# Patient Record
Sex: MALE | Race: BLACK OR AFRICAN AMERICAN | NOT HISPANIC OR LATINO | Employment: UNEMPLOYED
[De-identification: names, ages, dates, MRNs, and addresses within clinical notes are randomized per-mention and may not be internally consistent; named-entity substitution may affect disease eponyms.]

---

## 2023-10-22 ENCOUNTER — HEALTH MAINTENANCE LETTER (OUTPATIENT)
Age: 23
End: 2023-10-22

## 2023-11-22 ENCOUNTER — OFFICE VISIT (OUTPATIENT)
Dept: FAMILY MEDICINE | Facility: CLINIC | Age: 23
End: 2023-11-22
Payer: COMMERCIAL

## 2023-11-22 VITALS
WEIGHT: 260 LBS | DIASTOLIC BLOOD PRESSURE: 84 MMHG | TEMPERATURE: 97.9 F | BODY MASS INDEX: 33.37 KG/M2 | HEIGHT: 74 IN | RESPIRATION RATE: 16 BRPM | HEART RATE: 75 BPM | SYSTOLIC BLOOD PRESSURE: 130 MMHG | OXYGEN SATURATION: 100 %

## 2023-11-22 DIAGNOSIS — Z23 NEED FOR VACCINATION: ICD-10-CM

## 2023-11-22 DIAGNOSIS — D23.39 DERMOID CYST OF FOREHEAD: Primary | ICD-10-CM

## 2023-11-22 DIAGNOSIS — L30.9 DERMATITIS: ICD-10-CM

## 2023-11-22 PROCEDURE — 90471 IMMUNIZATION ADMIN: CPT | Performed by: FAMILY MEDICINE

## 2023-11-22 PROCEDURE — 90715 TDAP VACCINE 7 YRS/> IM: CPT | Performed by: FAMILY MEDICINE

## 2023-11-22 PROCEDURE — 99203 OFFICE O/P NEW LOW 30 MIN: CPT | Mod: 25 | Performed by: FAMILY MEDICINE

## 2023-11-22 RX ORDER — TRIAMCINOLONE ACETONIDE 1 MG/G
OINTMENT TOPICAL 2 TIMES DAILY
Qty: 30 G | Refills: 1 | Status: SHIPPED | OUTPATIENT
Start: 2023-11-22

## 2023-11-22 ASSESSMENT — PAIN SCALES - GENERAL: PAINLEVEL: NO PAIN (0)

## 2023-11-22 NOTE — PROGRESS NOTES
"  Assessment & Plan     Dermoid cyst of forehead  No obvious pore.  Due to location, will send to plastic surgery   Visible on face to naked eye  - Adult Plastic Surgery  Referral; Future    Need for vaccination  - TDAP 10-64Y (ADACEL,BOOSTRIX)    Dermatitis  - triamcinolone (KENALOG) 0.1 % external ointment; Apply topically 2 times daily    Schedule routine physical      BMI:   Estimated body mass index is 33.38 kg/m  as calculated from the following:    Height as of this encounter: 1.88 m (6' 2\").    Weight as of this encounter: 117.9 kg (260 lb).     Raz Romero DO  [facility]    Viki Mattson is a 23 year old, presenting for the following health issues:  Mass (On head)      11/22/2023     8:10 AM   Additional Questions   Roomed by Lola AMBRIZ       History of Present Illness       Reason for visit:  Bump on forehead  Symptom onset:  More than a month    He eats 0-1 servings of fruits and vegetables daily.He consumes 2 sweetened beverage(s) daily.He exercises with enough effort to increase his heart rate 9 or less minutes per day.  He exercises with enough effort to increase his heart rate 3 or less days per week.   He is taking medications regularly.     Bump on his head   Has been growing   On forehead   Doesn't hurt    Has been there for months          Objective    BP (!) 154/86 (BP Location: Left arm, Patient Position: Sitting, Cuff Size: Adult Large)   Pulse 75   Temp 97.9  F (36.6  C) (Temporal)   Resp 16   Ht 1.88 m (6' 2\")   Wt 117.9 kg (260 lb)   SpO2 100%   BMI 33.38 kg/m    Body mass index is 33.38 kg/m .      Physical Exam  Constitutional:       General: He is not in acute distress.  Eyes:      General: No scleral icterus.  Pulmonary:      Effort: No respiratory distress.   Skin:     Comments: Cyst under  skin of forehead. No pore  or sign of folliculitis       Neurological:      Mental Status: He is alert.   Psychiatric:         Mood and Affect: Mood " normal.         Behavior: Behavior normal.

## 2023-12-19 NOTE — TELEPHONE ENCOUNTER
FUTURE VISIT INFORMATION      FUTURE VISIT INFORMATION:  Date: 2/21/24  Time: 8:00am  Location: [facility]  REFERRAL INFORMATION:  Referring provider:    Raz Romero DO     Referring providers clinic:  [facility]  Reason for visit/diagnosis  Dermoid cyst of forehead // per pt // [facility]     RECORDS REQUESTED FROM:       Clinic name Comments Records Status Imaging Status   [facility] OV/referral 11/22/23 EPIC

## 2024-02-21 ENCOUNTER — PRE VISIT (OUTPATIENT)
Dept: PLASTIC SURGERY | Facility: CLINIC | Age: 24
End: 2024-02-21

## 2024-02-21 ENCOUNTER — OFFICE VISIT (OUTPATIENT)
Dept: PLASTIC SURGERY | Facility: CLINIC | Age: 24
End: 2024-02-21
Attending: FAMILY MEDICINE
Payer: COMMERCIAL

## 2024-02-21 VITALS
WEIGHT: 262.2 LBS | BODY MASS INDEX: 33.65 KG/M2 | DIASTOLIC BLOOD PRESSURE: 75 MMHG | HEIGHT: 74 IN | OXYGEN SATURATION: 99 % | SYSTOLIC BLOOD PRESSURE: 137 MMHG | HEART RATE: 70 BPM

## 2024-02-21 DIAGNOSIS — D23.39 DERMOID CYST OF FOREHEAD: ICD-10-CM

## 2024-02-21 PROCEDURE — 99204 OFFICE O/P NEW MOD 45 MIN: CPT | Performed by: PLASTIC SURGERY

## 2024-02-21 ASSESSMENT — PAIN SCALES - GENERAL: PAINLEVEL: NO PAIN (0)

## 2024-02-21 NOTE — NURSING NOTE
"Chief Complaint   Patient presents with    Consult     Dermoid cyst of forehead.       Vitals:    02/21/24 0759   BP: 137/75   BP Location: Left arm   Patient Position: Sitting   Cuff Size: Adult Large   Pulse: 70   SpO2: 99%   Weight: 118.9 kg (262 lb 3.2 oz)   Height: 1.88 m (6' 2\")       Body mass index is 33.66 kg/m .    Juancarlos Hogan EMT    "

## 2024-02-21 NOTE — PROGRESS NOTES
"Referring Provider:  Raz Romero, DO  [address] [place] [address]     Primary Care Provider:  No Ref-Primary, Physician      RE: Twila Alvarez.  : 2000.  BRADY: 2024.    Reason for visit: Forehead mass    HPI: The patient has had a mid forehead mass for about a year.  It slowly grew in size but has been stable for 6 months.  Causes him no pain no drainage no infection.  Would like it removed.    Medical history:  None    Surgical history:  None  Family history:  No family history on file.    Medications:  Current Outpatient Medications   Medication Sig Dispense Refill    triamcinolone (KENALOG) 0.1 % external ointment Apply topically 2 times daily (Patient not taking: Reported on 2024) 30 g 1       Allergies:  No Known Allergies    Social history:   Social History     Tobacco Use    Smoking status: Never    Smokeless tobacco: Never   Substance Use Topics    Alcohol use: Not on file         Physical Examination:  /75 (BP Location: Left arm, Patient Position: Sitting, Cuff Size: Adult Large)   Pulse 70   Ht 1.88 m (6' 2\")   Wt 118.9 kg (262 lb 3.2 oz)   SpO2 99%   BMI 33.66 kg/m    Body mass index is 33.66 kg/m .    General: No acute distress.    One by one and half centimeter soft mass in the subcutaneous plane of the mid forehead.        ASSESMENT and PLAN:     Based upon the above findings, a diagnosis of possible lipoma was made.  I had a francisco, detailed discussion with the patient, in the presence of my nurse, who was present from beginning to end.  I discussed with the patient the pathophysiology behind the problem, the concept behind the procedure/treatment proposed, expectations of the planned procedure(s), and all parth-operative steps.     All risks, benefits and alternatives, including but not limited to (what applies), pain, infection, bleeding, scarring, asymmetry, seromas, hematomas, wound breakdown, wound dehiscence, loss of the implants/flaps, abdominal " wall-healing issues, abdominal wall weakness, bulges, hernias, sensation loss, requirement of further staged procedures, Implant specific issues and complications as discussed above, removal of infected or exposed implants, pneumothoraces, contour abnormalities, cannula injuries to deeper structures, hernias, fat necrosis, lumps and bumps, loss of grafted material, DVT, PE, MI, CVA, pneumonia, renal failure and death, were explained. They were understood and agreed upon by the patient, they were acknowledged by the patient, all the patient's questions were answered in detail to the patient's fullest understanding that they acknowledged, the team approach for treatment in the operating room was agreed upon by the patient, and proceeding with surgery was agreed upon by the patient.    After our discussion, the patient is inclined towards excision.  He understands the risk of a permanent scar that is prominent, numbness in the area, indentation versus protrusion, recurrence, bleeding.  We will do this under local anesthesia at my [place] clinic.  Will add him as soon as possible.    All questions were answered. The patient was happy with the visit. I look forward to helping the patient out in the near future as indicated.       Total time spent in the encounter today including chart review, visit itself, and post-visit paperwork was 45 minutes.       Rosa Maria Ackerman MD    Chief, Division of Plastic Surgery  Department of Surgery  [facility]      CC: Raz Romero DO  [address] [address]  CC: No Ref-Primary, Physician

## 2024-02-21 NOTE — LETTER
"2024       RE: Twila Alvarez  [address] [place] [address]     Dear Colleague,    Thank you for referring your patient, Twila Alvarez, to the [facility] [place] at [facility]. Please see a copy of my visit note below.    Referring Provider:  Raz Romero DO  [address] [place] [address]     Primary Care Provider:  No Ref-Primary, Physician      RE: Twila Alvarez.  : 2000.  BRADY: 2024.    Reason for visit: Forehead mass    HPI: The patient has had a mid forehead mass for about a year.  It slowly grew in size but has been stable for 6 months.  Causes him no pain no drainage no infection.  Would like it removed.    Medical history:  None    Surgical history:  None  Family history:  No family history on file.    Medications:  Current Outpatient Medications   Medication Sig Dispense Refill    triamcinolone (KENALOG) 0.1 % external ointment Apply topically 2 times daily (Patient not taking: Reported on 2024) 30 g 1       Allergies:  No Known Allergies    Social history:   Social History     Tobacco Use    Smoking status: Never    Smokeless tobacco: Never   Substance Use Topics    Alcohol use: Not on file         Physical Examination:  /75 (BP Location: Left arm, Patient Position: Sitting, Cuff Size: Adult Large)   Pulse 70   Ht 1.88 m (6' 2\")   Wt 118.9 kg (262 lb 3.2 oz)   SpO2 99%   BMI 33.66 kg/m    Body mass index is 33.66 kg/m .    General: No acute distress.    One by one and half centimeter soft mass in the subcutaneous plane of the mid forehead.        ASSESMENT and PLAN:     Based upon the above findings, a diagnosis of possible lipoma was made.  I had a francisco, detailed discussion with the patient, in the presence of my nurse, who was present from beginning to end.  I discussed with the patient the pathophysiology behind the problem, the concept behind the " procedure/treatment proposed, expectations of the planned procedure(s), and all parth-operative steps.     All risks, benefits and alternatives, including but not limited to (what applies), pain, infection, bleeding, scarring, asymmetry, seromas, hematomas, wound breakdown, wound dehiscence, loss of the implants/flaps, abdominal wall-healing issues, abdominal wall weakness, bulges, hernias, sensation loss, requirement of further staged procedures, Implant specific issues and complications as discussed above, removal of infected or exposed implants, pneumothoraces, contour abnormalities, cannula injuries to deeper structures, hernias, fat necrosis, lumps and bumps, loss of grafted material, DVT, PE, MI, CVA, pneumonia, renal failure and death, were explained. They were understood and agreed upon by the patient, they were acknowledged by the patient, all the patient's questions were answered in detail to the patient's fullest understanding that they acknowledged, the team approach for treatment in the operating room was agreed upon by the patient, and proceeding with surgery was agreed upon by the patient.    After our discussion, the patient is inclined towards excision.  He understands the risk of a permanent scar that is prominent, numbness in the area, indentation versus protrusion, recurrence, bleeding.  We will do this under local anesthesia at my [facility].  Will add him as soon as possible.    All questions were answered. The patient was happy with the visit. I look forward to helping the patient out in the near future as indicated.       Total time spent in the encounter today including chart review, visit itself, and post-visit paperwork was 45 minutes.           Again, thank you for allowing me to participate in the care of your patient.      Sincerely,    TRAVON Ackerman MD

## 2024-12-15 ENCOUNTER — HEALTH MAINTENANCE LETTER (OUTPATIENT)
Age: 24
End: 2024-12-15